# Patient Record
Sex: MALE | Race: WHITE | Employment: FULL TIME | URBAN - METROPOLITAN AREA
[De-identification: names, ages, dates, MRNs, and addresses within clinical notes are randomized per-mention and may not be internally consistent; named-entity substitution may affect disease eponyms.]

---

## 2018-12-09 ENCOUNTER — HOSPITAL ENCOUNTER (EMERGENCY)
Age: 33
Discharge: HOME | End: 2018-12-09
Attending: EMERGENCY MEDICINE

## 2018-12-09 ENCOUNTER — APPOINTMENT (OUTPATIENT)
Dept: GENERAL RADIOLOGY | Age: 33
End: 2018-12-09

## 2018-12-09 VITALS
TEMPERATURE: 98.6 F | HEART RATE: 89 BPM | SYSTOLIC BLOOD PRESSURE: 156 MMHG | RESPIRATION RATE: 19 BRPM | DIASTOLIC BLOOD PRESSURE: 93 MMHG | OXYGEN SATURATION: 96 %

## 2018-12-09 DIAGNOSIS — S62.619A CLOSED FRACTURE OF PROXIMAL PHALANX OF DIGIT OF LEFT HAND, INITIAL ENCOUNTER: Primary | ICD-10-CM

## 2018-12-09 PROCEDURE — 73130 X-RAY EXAM OF HAND: CPT

## 2018-12-09 PROCEDURE — 6370000000 HC RX 637 (ALT 250 FOR IP): Performed by: PHYSICIAN ASSISTANT

## 2018-12-09 PROCEDURE — 99283 EMERGENCY DEPT VISIT LOW MDM: CPT

## 2018-12-09 RX ORDER — ACETAMINOPHEN 325 MG/1
650 TABLET ORAL ONCE
Status: COMPLETED | OUTPATIENT
Start: 2018-12-09 | End: 2018-12-09

## 2018-12-09 RX ORDER — TRAMADOL HYDROCHLORIDE 50 MG/1
50 TABLET ORAL EVERY 4 HOURS PRN
Qty: 18 TABLET | Refills: 0 | Status: SHIPPED | OUTPATIENT
Start: 2018-12-09 | End: 2018-12-12

## 2018-12-09 RX ORDER — OXYCODONE HYDROCHLORIDE 5 MG/1
5 TABLET ORAL ONCE
Status: COMPLETED | OUTPATIENT
Start: 2018-12-09 | End: 2018-12-09

## 2018-12-09 RX ADMIN — ACETAMINOPHEN 650 MG: 325 TABLET ORAL at 18:59

## 2018-12-09 RX ADMIN — OXYCODONE HYDROCHLORIDE 5 MG: 5 TABLET ORAL at 19:24

## 2018-12-09 ASSESSMENT — PAIN SCALES - GENERAL: PAINLEVEL_OUTOF10: 6

## 2018-12-09 NOTE — ED NOTES
Patient reports injury to left hand yesterday when he dropped a piece of heavy wood. Pain to hand and middle finger.       Dilcia Nance RN  12/09/18 3668

## 2018-12-10 ENCOUNTER — TELEPHONE (OUTPATIENT)
Dept: ORTHOPEDIC SURGERY | Age: 33
End: 2018-12-10

## 2018-12-11 ENCOUNTER — OFFICE VISIT (OUTPATIENT)
Dept: ORTHOPEDIC SURGERY | Age: 33
End: 2018-12-11

## 2018-12-11 VITALS
WEIGHT: 229.94 LBS | DIASTOLIC BLOOD PRESSURE: 72 MMHG | SYSTOLIC BLOOD PRESSURE: 126 MMHG | HEIGHT: 75 IN | BODY MASS INDEX: 28.59 KG/M2 | HEART RATE: 60 BPM

## 2018-12-11 DIAGNOSIS — S62.613A CLOSED DISPLACED FRACTURE OF PROXIMAL PHALANX OF LEFT MIDDLE FINGER, INITIAL ENCOUNTER: Primary | ICD-10-CM

## 2018-12-11 PROCEDURE — 99203 OFFICE O/P NEW LOW 30 MIN: CPT | Performed by: ORTHOPAEDIC SURGERY

## 2018-12-11 RX ORDER — HYDROCODONE BITARTRATE AND ACETAMINOPHEN 5; 325 MG/1; MG/1
1 TABLET ORAL EVERY 6 HOURS PRN
Qty: 9 TABLET | Refills: 0 | Status: SHIPPED | OUTPATIENT
Start: 2018-12-11 | End: 2018-12-18

## 2018-12-11 NOTE — PROGRESS NOTES
Chief Complaint  Injury (Left long finger)      History of Present Illness:  Abhijit Stovall is a 34 y.o. 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 No specific numbness or tingling, no history of previous surgery or injury to his left long finger or hand    Medical History  Patient's medications, allergies, past medical, surgical, social and family histories were reviewed and updated as appropriate. Review of Systems  Pertinent items are noted in HPI  Review of systems reviewed from Patient History Form dated on 39/87 and available in the patient's chart under the Media tab. Vital Signs  Vitals:    12/11/18 1132   BP: 126/72   Pulse: 60       General Exam:   Constitutional: Patient is adequately groomed with no evidence of malnutrition  Mental Status: The patient is oriented to time, place and person. The patient's mood and affect are appropriate. Lymphatic: The lymphatic examination bilaterally reveals all areas to be without enlargement or induration. Neurological: The patient has good coordination. There is no weakness or sensory deficit.     Left long Finger/hand Examination  Inspection:  Diffuse swelling of the left long finger and mild swelling of hand with compartments soft and compressible  No obvious open wounds  There is evidence of mild angulation of left long finger ulnarly as well as malrotation with attempted fist of the long finger    Palpation:  Tenderness to palpation throughout the left long finger with particular tenderness at proximal phalanx    Range of Motion:  Deferred formal range of motion the patient does demonstrate limited active motion of long finger with extension and flexion    Strength: Active FDS, FDP, EDC with limited overall strength secondary to discomfort and known injury    Special Tests:  Gross sensation intact median ulnar and radial nerve without deficit, capillary refill brisk in all fingers,  Sensation is intact to radial and ulnar aspect of long fingertip    Skin: There are no additional worrisome rashes, ulcerations or lesions. Gait: normal    Circulation: well perfused with capillary refill brisk in all fingers        Additional Comments:     Additional Examinations:  Right Upper Extremity:  Examination of the right upper extremity does not show any tenderness, deformity or injury. Range of motion is unremarkable. There is no gross instability. There are no rashes, ulcerations or lesions. Strength and tone are normal.      Radiology:     X-rays  reviewed in office from 12/9/2018:  Images demonstrate long oblique fracture of proximal phalanx left long finger with displacement and shortening  approximately 2-3 mm and what appears to be comminution at distal aspect of fracture       Assessment:  60-year-old male presenting with history of left long finger injury after crush injury from railroad tie  1. Left long finger proximal phalanx extra-articular fracture with displacement    Impression:   No diagnosis found. Office Procedures:  No orders of the defined types were placed in this encounter. Treatment Plan:  I spoke with the patient today regarding his symptoms and clinical examination. He does demonstrate evidence of angulation and malrotation clinically.  Based on fracture pattern I do think that he would benefit from operative intervention for correction of his overall alignment as well

## 2018-12-13 ENCOUNTER — TELEPHONE (OUTPATIENT)
Dept: ORTHOPEDIC SURGERY | Age: 33
End: 2018-12-13

## 2018-12-19 ENCOUNTER — TELEPHONE (OUTPATIENT)
Dept: ORTHOPEDIC SURGERY | Age: 33
End: 2018-12-19

## 2018-12-20 ENCOUNTER — TELEPHONE (OUTPATIENT)
Dept: ORTHOPEDIC SURGERY | Age: 33
End: 2018-12-20

## 2018-12-31 ENCOUNTER — OFFICE VISIT (OUTPATIENT)
Dept: ORTHOPEDIC SURGERY | Age: 33
End: 2018-12-31

## 2018-12-31 DIAGNOSIS — S62.613A CLOSED DISPLACED FRACTURE OF PROXIMAL PHALANX OF LEFT MIDDLE FINGER, INITIAL ENCOUNTER: Primary | ICD-10-CM

## 2018-12-31 PROCEDURE — 99213 OFFICE O/P EST LOW 20 MIN: CPT | Performed by: ORTHOPAEDIC SURGERY

## 2018-12-31 NOTE — PROGRESS NOTES
Assessment: 37-year-old male presenting with history of left long finger injury after crush injury from railroad tie  1. Left long finger proximal phalanx extra-articular fracture with displacement  Treatment Plan: I emphasized the patient today his alignment and a recommendation for surgical intervention. Now 3 weeks and just over 3 weeks since the surgery does less than ideal to consider surgical intervention. Despite recommendations, the patient continues to state that he would prefer nonoperative intervention secondary to his current social situation. In that case, although challenging we will attempt to continue to treat with limited immobilization and avoiding any heavy lifting or gripping. Concetta taping and concetta strapping techniques discussed with patient today as well as beginning some gentle range of motion at the PIP and DIP joints. We'll continue to monitor his healing and I have offered him a follow-up in 2-3 weeks for repeat evaluation and imaging. Again, he is clear regarding his current alignment and the options for treatment and elects to continue with nonoperative treatment at this point    No Follow-up on file. History of Present Illness  Noel High is a 34 y.o. 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 I saw him several days after his injury and spoke with him about his displaced proximal phalanx fracture. I had recommended surgical intervention and had scheduled surgery secondary to displacement but unfortunately the patient had to cancel his surgery secondary to multiple social and financial situations. He is here today for repeat checkup.  He does have stiffness in his finger but continues to have swelling, pain is overall improved    Review of Systems  Pertinent items are noted in HPI  Review of systems reviewed from Patient

## 2019-01-14 ENCOUNTER — OFFICE VISIT (OUTPATIENT)
Dept: ORTHOPEDIC SURGERY | Age: 34
End: 2019-01-14

## 2019-01-14 DIAGNOSIS — S62.633A CLOSED DISPLACED FRACTURE OF DISTAL PHALANX OF LEFT MIDDLE FINGER, INITIAL ENCOUNTER: Primary | ICD-10-CM

## 2019-01-14 PROCEDURE — 99213 OFFICE O/P EST LOW 20 MIN: CPT | Performed by: ORTHOPAEDIC SURGERY

## 2019-02-11 ENCOUNTER — OFFICE VISIT (OUTPATIENT)
Dept: ORTHOPEDIC SURGERY | Age: 34
End: 2019-02-11

## 2019-02-11 DIAGNOSIS — S62.633A CLOSED DISPLACED FRACTURE OF DISTAL PHALANX OF LEFT MIDDLE FINGER, INITIAL ENCOUNTER: Primary | ICD-10-CM

## 2019-02-11 PROCEDURE — 99213 OFFICE O/P EST LOW 20 MIN: CPT | Performed by: ORTHOPAEDIC SURGERY

## 2020-06-21 ENCOUNTER — HOSPITAL ENCOUNTER (EMERGENCY)
Age: 35
Discharge: HOME | End: 2020-06-22

## 2020-06-21 ENCOUNTER — APPOINTMENT (OUTPATIENT)
Dept: CT IMAGING | Age: 35
End: 2020-06-21

## 2020-06-21 PROCEDURE — 70450 CT HEAD/BRAIN W/O DYE: CPT

## 2020-06-21 PROCEDURE — 99284 EMERGENCY DEPT VISIT MOD MDM: CPT

## 2020-06-21 ASSESSMENT — PAIN SCALES - GENERAL: PAINLEVEL_OUTOF10: 7

## 2020-06-22 VITALS
HEART RATE: 64 BPM | SYSTOLIC BLOOD PRESSURE: 119 MMHG | DIASTOLIC BLOOD PRESSURE: 78 MMHG | RESPIRATION RATE: 16 BRPM | TEMPERATURE: 98.3 F | OXYGEN SATURATION: 99 %

## 2020-06-22 NOTE — ED PROVIDER NOTES
the ROS, all other systems were reviewed and negative. PAST MEDICAL HISTORY     Past Medical History:   Diagnosis Date    Heroin abuse [facility])          SURGICAL HISTORY     Past Surgical History:   Procedure Laterality Date    ARM SURGERY  09/2016    HERNIA REPAIR  3rd grade    TIBIA / Skip Parents LENGTHENING      pinning 2013         [address]       Discharge Medication List as of 6/21/2020 11:53 PM      CONTINUE these medications which have NOT CHANGED    Details   !! ibuprofen (ADVIL;MOTRIN) 200 MG tablet Take 200 mg by mouth every 6 hours as needed for Pain      !! ibuprofen (ADVIL;MOTRIN) 800 MG tablet Take 1 tablet by mouth every 8 hours as needed for Pain, Disp-30 tablet, R-0       !! - Potential duplicate medications found. Please discuss with provider. ALLERGIES     Patient has no known allergies. FAMILYHISTORY     History reviewed. No pertinent family history. SOCIAL HISTORY       Social History     Tobacco Use    Smoking status: Former Smoker     Packs/day: 0.50     Types: Cigarettes    Smokeless tobacco: Never Used   Substance Use Topics    Alcohol use: No    Drug use: Not Currently     Types: IV       SCREENINGS             PHYSICAL EXAM    (up to 7 for level 4, 8 or more for level 5)     ED Triage Vitals [06/21/20 2222]   BP Temp Temp src Pulse Resp SpO2 Height Weight   (!) 131/94 98.3 °F (36.8 °C) -- 87 16 98 % -- --       Physical Exam  Vitals signs and nursing note reviewed. Constitutional:       Appearance: He is well-developed. He is not diaphoretic. HENT:      Head: Atraumatic. Comments: No raccoon eyes or cassidy sign. No hemotympanum, CSF rhinorrhea or obvious sign of trauma. Right Ear: Tympanic membrane normal. There is no impacted cerumen. Left Ear: Tympanic membrane normal. There is no impacted cerumen. Nose: Nose normal.   Eyes:      General:         Right eye: No discharge. Left eye: No discharge.       Extraocular Movements:

## 2020-09-25 ENCOUNTER — HOSPITAL ENCOUNTER (EMERGENCY)
Age: 35
Discharge: HOME | End: 2020-09-25
Attending: EMERGENCY MEDICINE

## 2020-09-25 VITALS
RESPIRATION RATE: 16 BRPM | WEIGHT: 250 LBS | OXYGEN SATURATION: 98 % | HEART RATE: 84 BPM | HEIGHT: 75 IN | TEMPERATURE: 99.5 F | SYSTOLIC BLOOD PRESSURE: 143 MMHG | DIASTOLIC BLOOD PRESSURE: 79 MMHG | BODY MASS INDEX: 31.08 KG/M2

## 2020-09-25 PROCEDURE — 99283 EMERGENCY DEPT VISIT LOW MDM: CPT

## 2020-09-25 RX ORDER — GINSENG 100 MG
CAPSULE ORAL
Status: DISCONTINUED
Start: 2020-09-25 | End: 2020-09-25 | Stop reason: HOSPADM

## 2020-09-25 ASSESSMENT — PAIN DESCRIPTION - LOCATION: LOCATION: ARM

## 2020-09-25 ASSESSMENT — PAIN DESCRIPTION - ORIENTATION: ORIENTATION: LEFT

## 2020-09-25 ASSESSMENT — PAIN SCALES - GENERAL: PAINLEVEL_OUTOF10: 3

## 2020-09-25 ASSESSMENT — PAIN DESCRIPTION - DESCRIPTORS: DESCRIPTORS: ACHING

## 2020-09-25 ASSESSMENT — PAIN DESCRIPTION - PAIN TYPE: TYPE: ACUTE PAIN
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
## 2021-04-13 ENCOUNTER — HOSPITAL ENCOUNTER (EMERGENCY)
Age: 36
Discharge: HOME | End: 2021-04-13
Attending: EMERGENCY MEDICINE

## 2021-04-13 VITALS
BODY MASS INDEX: 31.08 KG/M2 | RESPIRATION RATE: 16 BRPM | DIASTOLIC BLOOD PRESSURE: 72 MMHG | HEART RATE: 73 BPM | HEIGHT: 75 IN | WEIGHT: 250 LBS | TEMPERATURE: 97.1 F | OXYGEN SATURATION: 100 % | SYSTOLIC BLOOD PRESSURE: 117 MMHG

## 2021-04-13 DIAGNOSIS — F32.A DEPRESSION, UNSPECIFIED DEPRESSION TYPE: ICD-10-CM

## 2021-04-13 DIAGNOSIS — T40.1X1A ACCIDENTAL OVERDOSE OF HEROIN, INITIAL ENCOUNTER (HCC): Primary | ICD-10-CM

## 2021-04-13 PROCEDURE — 96374 THER/PROPH/DIAG INJ IV PUSH: CPT

## 2021-04-13 PROCEDURE — 6360000002 HC RX W HCPCS

## 2021-04-13 PROCEDURE — 99283 EMERGENCY DEPT VISIT LOW MDM: CPT

## 2021-04-13 PROCEDURE — 6360000002 HC RX W HCPCS: Performed by: EMERGENCY MEDICINE

## 2021-04-13 RX ORDER — NALOXONE HYDROCHLORIDE 1 MG/ML
2 INJECTION INTRAMUSCULAR; INTRAVENOUS; SUBCUTANEOUS ONCE
Status: COMPLETED | OUTPATIENT
Start: 2021-04-13 | End: 2021-04-13

## 2021-04-13 RX ORDER — NALOXONE HYDROCHLORIDE 1 MG/ML
INJECTION INTRAMUSCULAR; INTRAVENOUS; SUBCUTANEOUS
Status: DISCONTINUED
Start: 2021-04-13 | End: 2021-04-13 | Stop reason: HOSPADM

## 2021-04-13 RX ORDER — NALOXONE HYDROCHLORIDE 4 MG/.1ML
1 SPRAY NASAL PRN
Status: DISCONTINUED | OUTPATIENT
Start: 2021-04-13 | End: 2021-04-13 | Stop reason: HOSPADM

## 2021-04-13 RX ORDER — LORAZEPAM 2 MG/ML
INJECTION INTRAMUSCULAR
Status: COMPLETED
Start: 2021-04-13 | End: 2021-04-13

## 2021-04-13 RX ADMIN — LORAZEPAM 2 MG: 2 INJECTION INTRAMUSCULAR; INTRAVENOUS at 13:05

## 2021-04-13 RX ADMIN — NALOXONE HYDROCHLORIDE 2 MG: 1 INJECTION PARENTERAL at 12:07

## 2021-04-13 NOTE — ED NOTES
Patient asleep resp easy and full. Family at bedside.   Seizure pads still on bed     Dena Ward RN  04/13/21 0678
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
## 2021-04-13 NOTE — ED NOTES
Larose Paget remains at bedside.   PT with intermittent bouts of thrashing     Ag Mcgee, VERONICA  04/13/21 4186

## 2021-04-13 NOTE — ED NOTES
PT given narcan to go kit and shown how to use it.   PT given information for treatment centers     Jonny Kimble, [facility]  04/13/21 8872

## 2021-04-13 NOTE — ED PROVIDER NOTES
[facility]        Pt Name: Liz Bailey  MRN: [id number]  [place] 1985  Date of evaluation: 4/13/2021  Provider: Good Heaton PA-C  PCP: No primary care provider on file. I have seen and evaluated this patient with my supervising physician Adolfo Koroma. The total critical care time spent while evaluating and treating this patient was at least 33 minutes. This excludes time spent doing separately billable procedures. This includes time at the bedside, data interpretation, medication management, obtaining critical history from collateral sources if the patient is unable to provide it directly, and physician consultation. Specifics of interventions taken and potentially life-threatening diagnostic considerations are listed above in the medical decision making. CHIEF COMPLAINT       Chief Complaint   Patient presents with    Drug Overdose     in with friend states was on the way to work and overdosed        HISTORY OF PRESENT ILLNESS   (Location, Timing/Onset, Context/Setting, Quality, Duration, Modifying Factors, Severity, Associated Signs and Symptoms)  Note limiting factors. Liz Bailey is a 31 y.o. male presents to the emergency department after he was found by his coworker unresponsive after snorting some substance. I took over this patient from attending physician who had seen him a couple hours before I started my shift. Patient was observed here for over 6 hours. He became alert. I went back and discussed with the patient when it happened. He states that he has been depressed ever since his fiancée broke up with him a year ago. He brought a 2 mg Xanax bar from the street and states that he took this orally and snorted this to help him sleep. This is the first time he is used anything like this and over a year. Denies overuse of alcohol or any other illicit drug use.   He denies headache, visual change, nausea, vomiting, difficulty moving his extremities, chest pain, shortness of breath, urinary or bowel symptoms. He states he has been having issues sleeping and feeling depressed but he denies any suicidal homicidal ideation. He states he had thoughts like this a year ago but has no plan to hurt himself now and has no thoughts at this time and states he actually feels like he is doing better than he did a year ago. Denies any other symptoms. Nursing Notes were all reviewed and agreed with or any disagreements were addressed in the HPI. REVIEW OF SYSTEMS    (2-9 systems for level 4, 10 or more for level 5)     Review of Systems    Positives and Pertinent negatives as per HPI. Except as noted above in the ROS, all other systems were reviewed and negative. PAST MEDICAL HISTORY     Past Medical History:   Diagnosis Date    Heroin abuse [facility])          SURGICAL HISTORY     Past Surgical History:   Procedure Laterality Date    ARM SURGERY  09/2016    HERNIA REPAIR  3rd grade    TIBIA / [place] LENGTHENING      pinning 2013         [address]       Discharge Medication List as of 4/13/2021  6:00 PM      CONTINUE these medications which have NOT CHANGED    Details   !! ibuprofen (ADVIL;MOTRIN) 200 MG tablet Take 200 mg by mouth every 6 hours as needed for Pain      !! ibuprofen (ADVIL;MOTRIN) 800 MG tablet Take 1 tablet by mouth every 8 hours as needed for Pain, Disp-30 tablet, R-0       !! - Potential duplicate medications found. Please discuss with provider. ALLERGIES     Patient has no known allergies. FAMILYHISTORY     History reviewed. No pertinent family history.        SOCIAL HISTORY       Social History     Tobacco Use    Smoking status: Former Smoker     Packs/day: 0.50     Types: Cigarettes    Smokeless tobacco: Never Used   Substance Use Topics    Alcohol use: No    Drug use: Not Currently     Types: IV       SCREENINGS             PHYSICAL EXAM (up to 7 for level 4, 8 or more for level 5)     ED Triage Vitals   BP Temp Temp Source Pulse Resp SpO2 Height Weight   04/13/21 1209 04/13/21 1759 04/13/21 1209 04/13/21 1209 04/13/21 1209 04/13/21 1209 04/13/21 1209 04/13/21 1209   118/75 97.1 °F (36.2 °C) Temporal 64 16 100 % 6' 3\" (1.905 m) 250 lb (113.4 kg)       Physical Exam  Vitals signs and nursing note reviewed. Constitutional:       Appearance: He is well-developed. He is not diaphoretic. HENT:      Head: Atraumatic. Nose: Nose normal.   Eyes:      General:         Right eye: No discharge. Left eye: No discharge. Extraocular Movements: Extraocular movements intact. Pupils: Pupils are equal, round, and reactive to light. Neck:      Musculoskeletal: Normal range of motion. Cardiovascular:      Rate and Rhythm: Normal rate and regular rhythm. Heart sounds: No murmur. No friction rub. No gallop. Pulmonary:      Effort: Pulmonary effort is normal. No respiratory distress. Breath sounds: No stridor. No wheezing, rhonchi or rales. Abdominal:      General: Bowel sounds are normal. There is no distension. Palpations: Abdomen is soft. There is no mass. Tenderness: There is no abdominal tenderness. There is no guarding or rebound. Hernia: No hernia is present. Musculoskeletal: Normal range of motion. General: No swelling. Skin:     General: Skin is warm and dry. Findings: No erythema or rash. Neurological:      Mental Status: He is alert and oriented to person, place, and time. Cranial Nerves: No cranial nerve deficit. Psychiatric:         Behavior: Behavior normal.         DIAGNOSTIC RESULTS   LABS:    Labs Reviewed - No data to display    All other labs were within normal range or not returned as of this dictation. EKG:  All EKG's are interpreted by the Emergency Department Physician in the absence of a cardiologist.  Please see their note for interpretation of EKG.      RADIOLOGY:   Non-plain film images such as CT, Ultrasound and MRI are read by the radiologist. Plain radiographic images are visualized and preliminarily interpreted by the ED Provider with the below findings:        Interpretation per the Radiologist below, if available at the time of this note:    No orders to display     No results found. PROCEDURES   Unless otherwise noted below, none     Procedures    CRITICAL CARE TIME   N/A    CONSULTS:  None      EMERGENCY DEPARTMENT COURSE and DIFFERENTIAL DIAGNOSIS/MDM:   Vitals:    Vitals:    04/13/21 1645 04/13/21 1700 04/13/21 1715 04/13/21 1759   BP: 120/70 114/69 117/72    Pulse:       Resp:       Temp:    97.1 °F (36.2 °C)   TempSrc:    Infrared   SpO2: 100% 100% 100%    Weight:       Height:           Patient was given the following medications:  Medications   naloxone (NARCAN) injection 2 mg (2 mg Intravenous Given 4/13/21 1207)   LORazepam (ATIVAN) 2 MG/ML injection (2 mg  Given 4/13/21 1305)           Patient presented after being found unresponsive. Received Narcan here. Patient was observed here for multiple hours. He is now alert and oriented. States he has been having some depression but denies suicidal homicidal ideation or any plan to hurt himself. He was given resource numbers for [facility] where he lives. He is ambulatory here. No sign of trauma. Low suspicion for intracranial normality, acute sepsis, acute abdomen or other emergent etiology. Was discharged home with Narcan. States he only brought 1 of those tablets. Most likely laced with opioids. Patient understood instructions at discharge and was stable discharge. FINAL IMPRESSION      1. Accidental overdose of heroin, initial encounter ([facility] [address]    2.  Depression, unspecified depression type          DISPOSITION/PLAN   DISPOSITION Decision To Discharge 04/13/2021 05:46:01 PM      PATIENT REFERREDTO:  [facility]) Pre-Services  [phone]          DISCHARGE MEDICATIONS:  Discharge Medication List as of 4/13/2021  6:00 PM      START taking these medications    Details   Naloxone HCl (NALOXONE OPIATE OVERDOSE KIT) 1 each by Nasal route once for 1 dose, Disp-1 kit, R-0Print             DISCONTINUED MEDICATIONS:  Discharge Medication List as of 4/13/2021  6:00 PM                 (Please note that portions of this note were completed with a voice recognition program.  Efforts were made to edit the dictations but occasionally words are mis-transcribed.)    Jeremías Flores PA-C (electronically signed)           Jeremías Flores PA-C  04/13/21 0031

## 2021-04-13 NOTE — ED PROVIDER NOTES
[facility]        Pt Name: Heath Gómez  MRN: [id number]  [place] 1985  Date of evaluation: 4/13/2021  Provider: Sunitha Bundy MD  PCP: No primary care provider on file. This patient was seen and evaluated by the attending physician Sunitha Bundy MD.      CHIEF COMPLAINT       Chief Complaint   Patient presents with    Drug Overdose     in with friend states was on the way to work and overdosed        HISTORY OF PRESENT ILLNESS   (Location/Symptom, Timing/Onset, Context/Setting, Quality, Duration, Modifying Factors, Severity)  Note limiting factors. Heath Gómez is a 31 y.o. male by his friend when he overdosed in the car. Patient was insufflating heroin or some other opioid. Friend drove him here and he got Narcan here to revive him. Patient is awake alert oriented. Somewhat groggy still from the opioids. Nursing Notes were all reviewed and agreed with or any disagreements were addressed  in the HPI. REVIEW OF SYSTEMS    (2-9 systems for level 4, 10 or more for level 5)     Review of Systems    Positives and Pertinent negatives as per HPI. Except as noted abovein the ROS, all other systems were reviewed and negative. PAST MEDICAL HISTORY     Past Medical History:   Diagnosis Date    Heroin abuse ([facility] [address]          SURGICAL HISTORY     Past Surgical History:   Procedure Laterality Date    ARM SURGERY  09/2016    HERNIA REPAIR  3rd grade    TIBIA / FIBIA LENGTHENING      pinning 2013         CURRENTMEDICATIONS       Previous Medications    IBUPROFEN (ADVIL;MOTRIN) 200 MG TABLET    Take 200 mg by mouth every 6 hours as needed for Pain    IBUPROFEN (ADVIL;MOTRIN) 800 MG TABLET    Take 1 tablet by mouth every 8 hours as needed for Pain         ALLERGIES     Patient has no known allergies. FAMILYHISTORY     History reviewed. No pertinent family history.        SOCIAL HISTORY       Social History Symmetric x4   Skin:  No rashes or lesions to exposed skin. Neurologic: Alert and oriented X 3. Motor grossly normal.  Speech clear. DIAGNOSTIC RESULTS   LABS:    Labs Reviewed - No data to display    All other labs were within normal range or not returned as of this dictation. EKG: All EKG's are interpreted by the Emergency Department Physician in the absence of a cardiologist.  Please see their note for interpretation of EKG. RADIOLOGY:   Non-plain film images such as CT, Ultrasound and MRI are read by the radiologist. Plain radiographic images are visualized andpreliminarily interpreted by the  ED Provider with the below findings:        Interpretation [facility] Radiologist below, if available at the time of this note:    No orders to display     No results found. PROCEDURES   Unless otherwise noted below, none     Procedures    CRITICAL CARE TIME   N/A    CONSULTS:  None      EMERGENCY DEPARTMENT COURSE and DIFFERENTIAL DIAGNOSIS/MDM:   Vitals:    Vitals:    04/13/21 1417 04/13/21 1500 04/13/21 1503 04/13/21 1550   BP: 130/70 126/74 126/74 119/69   Pulse:  73     Resp:  16     TempSrc:       SpO2: 96% 99% 97%    Weight:       Height:           Patient was given thefollowing medications:  Medications   naloxone (NALOXONE TO-GO) 4 mg/0.1 mL nasal spray (has no administration in time range)   naloxone (NARCAN) 2 MG/2ML injection (has no administration in time range)   naloxone [facility]) injection 2 mg (2 mg Intravenous Given 4/13/21 1207)   LORazepam (ATIVAN) 2 MG/ML injection (2 mg  Given 4/13/21 1305)       81-year-old male is in opioid overdose whom got Narcan here in department. At present no complaint. Looks well. Will observe. After receiving Narcan became combative. The took an unknown drug ingestion which given local recent overdose patterns to be a mix of fentanyl and methamphetamines. He calmed down with some benzodiazepines. We'll continue to observe.     35min critical care time  Indication and intervetion as above. Will DC home with narcan. FINAL IMPRESSION      1.  Accidental overdose of heroin, initial encounter [facility])          DISPOSITION/PLAN   DISPOSITION        PATIENT REFERREDTO:  [facility]) Pre-Services  [phone]          DISCHARGE MEDICATIONS:  New Prescriptions    NALOXONE HCL (NALOXONE OPIATE OVERDOSE KIT)    1 each by Nasal route once for 1 dose       DISCONTINUED MEDICATIONS:  Discontinued Medications    No medications on file              (Please note that portions ofthis note were completed with a voice recognition program.  Efforts were made to edit the dictations but occasionally words are mis-transcribed.)    Jerad Johnson MD (electronically signed)           Jerad Johnson MD  04/13/21 6655

## 2021-04-13 NOTE — ED NOTES
Bed: [address]06  Expected date:   Expected time:   Means of arrival:   Comments:  linsey Avila RN  04/13/21 1496

## 2021-04-13 NOTE — ED NOTES
PT placed in room 1, PT thrashing in bed, bilat soft UE restraints placed on PT for safety. Dayne Grijalva and myself at bedside. PT continues to thrash in bed. Dr Janki Walsh at bedside. PT given 2MG IN ativan as ordered. Friend at bedside. Bed alarm activated. Seizure pads placed on bed for PT safety. PT unable to place on monitors at this time.       Jonny Kimble RN  04/13/21 Marleny Mejia RN  04/13/21 [id number]

## 2021-04-13 NOTE — ED NOTES
PT OOB, PT ready to go, Aunt here to drive PT home     [facility]  04/13/21 1808

## 2021-04-13 NOTE — ED NOTES
Ativan given internasal with .5 ml r nares and .5 ml l nares  Seizure pads on bed.        Tuan Gonsalves RN  04/13/21 8124

## 2021-04-13 NOTE — ED NOTES
PT moved to bed 1, bed alarm activated. PT attached to cycling monitors. Friend at bedside.       Andrews Guzmán RN  04/13/21 6816